# Patient Record
Sex: FEMALE | Race: WHITE | NOT HISPANIC OR LATINO | Employment: OTHER | URBAN - NONMETROPOLITAN AREA
[De-identification: names, ages, dates, MRNs, and addresses within clinical notes are randomized per-mention and may not be internally consistent; named-entity substitution may affect disease eponyms.]

---

## 2017-02-16 DIAGNOSIS — I10 ESSENTIAL HYPERTENSION: ICD-10-CM

## 2017-02-16 RX ORDER — AMLODIPINE BESYLATE 5 MG/1
5 TABLET ORAL
Qty: 90 TAB | Refills: 3 | Status: SHIPPED | OUTPATIENT
Start: 2017-02-16 | End: 2018-02-14 | Stop reason: SDUPTHER

## 2017-04-13 ENCOUNTER — OFFICE VISIT (OUTPATIENT)
Dept: CARDIOLOGY | Facility: PHYSICIAN GROUP | Age: 82
End: 2017-04-13
Payer: MEDICARE

## 2017-04-13 VITALS
HEART RATE: 54 BPM | SYSTOLIC BLOOD PRESSURE: 180 MMHG | HEIGHT: 63 IN | WEIGHT: 102 LBS | BODY MASS INDEX: 18.07 KG/M2 | DIASTOLIC BLOOD PRESSURE: 100 MMHG | OXYGEN SATURATION: 94 %

## 2017-04-13 DIAGNOSIS — I65.23 BILATERAL CAROTID ARTERY STENOSIS: ICD-10-CM

## 2017-04-13 DIAGNOSIS — I11.9 BENIGN HYPERTENSIVE HEART DISEASE WITHOUT HEART FAILURE: ICD-10-CM

## 2017-04-13 DIAGNOSIS — I48.0 PAROXYSMAL ATRIAL FIBRILLATION (HCC): ICD-10-CM

## 2017-04-13 PROCEDURE — G8432 DEP SCR NOT DOC, RNG: HCPCS | Performed by: INTERNAL MEDICINE

## 2017-04-13 PROCEDURE — G8598 ASA/ANTIPLAT THER USED: HCPCS | Performed by: INTERNAL MEDICINE

## 2017-04-13 PROCEDURE — 4040F PNEUMOC VAC/ADMIN/RCVD: CPT | Mod: 8P | Performed by: INTERNAL MEDICINE

## 2017-04-13 PROCEDURE — 1036F TOBACCO NON-USER: CPT | Performed by: INTERNAL MEDICINE

## 2017-04-13 PROCEDURE — 99214 OFFICE O/P EST MOD 30 MIN: CPT | Performed by: INTERNAL MEDICINE

## 2017-04-13 PROCEDURE — G8419 CALC BMI OUT NRM PARAM NOF/U: HCPCS | Performed by: INTERNAL MEDICINE

## 2017-04-13 PROCEDURE — 1101F PT FALLS ASSESS-DOCD LE1/YR: CPT | Mod: 8P | Performed by: INTERNAL MEDICINE

## 2017-04-13 ASSESSMENT — ENCOUNTER SYMPTOMS
SHORTNESS OF BREATH: 0
DEPRESSION: 0
PALPITATIONS: 0
COUGH: 0
WEIGHT LOSS: 0
HEADACHES: 0
LOSS OF CONSCIOUSNESS: 0
DIZZINESS: 0
INSOMNIA: 1
BRUISES/BLEEDS EASILY: 0
NERVOUS/ANXIOUS: 1
MYALGIAS: 0
NAUSEA: 0
HEARTBURN: 0

## 2017-04-13 NOTE — PROGRESS NOTES
Subjective:   Kaylni Chawla is a 83 y.o. female who presents today in f/u in regards to her mild carotid disease and HTN and AF    -very anxious  -meds as directed  -cp now with walking, no more palps    Past Medical History   Diagnosis Date   • Occlusion and stenosis of carotid artery without mention of cerebral infarction October 2012     Carotid ultrasound with <50% stenosis bilaterally.   • Dyslipidemia     • Essential hypertension, benign     • Hypothyroidism     • Irritable bowel syndrome (IBS)     • Valvular heart disease       H/O MVP, not seen on echo of 2009   • Paroxysmal atrial fibrillation (CMS-HCC) April 2016     Isolated event, converted spontaneously to sinus rhythm.   • Depression    • Benign hypertensive heart disease without heart failure May 2016     Echocardiogram with normal LV size, LVEF 55%. Mildly dilated RV. Normal RA and LA. Trace MR. Mild AS (peak 24mmHg, mean 13mmHg). Moderate TR.     Past Surgical History   Procedure Laterality Date   • Hysterectomy, total abdominal     • Tonsillectomy and adenoidectomy     • Hemorrhoidectomy     • Primary c section     • Other surgical procedure       Bladder repair X 3   • Breast biopsy       Open   • Cholecystectomy       Family History   Problem Relation Age of Onset   • Diabetes Father    • Heart Attack Father    • GI Father 67     AMI   • Heart Disease Sister    • Heart Attack Brother 45     Fatal     History   Smoking status   • Never Smoker    Smokeless tobacco   • Never Used     No Known Allergies  Outpatient Encounter Prescriptions as of 4/13/2017   Medication Sig Dispense Refill   • amlodipine (NORVASC) 5 MG Tab Take 1 Tab by mouth every day. 90 Tab 3   • enalapril (VASOTEC) 20 MG tablet Take 20 mg by mouth 2 times a day.     • aspirin EC (ECOTRIN) 81 MG TBEC Take 81 mg by mouth every day.     • CALCIUM PO Take  by mouth. As directed      • docusate sodium (COLACE) 100 MG CAPS Take 100 mg by mouth 2 times a day. As directed     •  "atorvastatin (LIPITOR) 40 MG TABS Take 40 mg by mouth every evening.     • Multiple Vitamin (MULTIVITAMINS PO) Take  by mouth every day.     • felodipine (PLENDIL) 10 MG TB24 Take 10 mg by mouth every day.     • levothyroxine (SYNTHROID) 25 MCG TABS Take 25 mcg by mouth every day.     • ascorbic acid (ASCORBIC ACID) 500 MG TABS Take 500 mg by mouth every day.       No facility-administered encounter medications on file as of 4/13/2017.     Review of Systems   Constitutional: Negative for weight loss and malaise/fatigue.   Respiratory: Negative for cough and shortness of breath.    Cardiovascular: Negative for chest pain, palpitations and leg swelling.   Gastrointestinal: Negative for heartburn and nausea.   Genitourinary: Negative for frequency.   Musculoskeletal: Negative for myalgias.   Neurological: Negative for dizziness, loss of consciousness and headaches.   Endo/Heme/Allergies: Does not bruise/bleed easily.   Psychiatric/Behavioral: Negative for depression and suicidal ideas. The patient is nervous/anxious and has insomnia.    All other systems reviewed and are negative.       Objective:   /100 mmHg  Pulse 54  Ht 1.6 m (5' 2.99\")  Wt 46.267 kg (102 lb)  BMI 18.07 kg/m2  SpO2 94%    Physical Exam   Constitutional: She is oriented to person, place, and time. She appears well-nourished.   Timid, healthy-appearing   HENT:   Head: Atraumatic.   Neck: No JVD present.   Cardiovascular: Normal rate, regular rhythm and intact distal pulses.    No murmur heard.  Pulmonary/Chest: Breath sounds normal.   Musculoskeletal: She exhibits no edema.   Neurological: She is alert and oriented to person, place, and time.   Skin: Skin is warm and dry.   Psychiatric: She has a normal mood and affect. Her behavior is normal.       Assessment:     1. Bilateral carotid artery stenosis     2. Benign hypertensive heart disease without heart failure     3. Paroxysmal atrial fibrillation (CMS-[facility])         Medical Decision " Making:  Today's Assessment / Status / Plan:     Af, persistant.   She is aware of her risk of stroke, but is more concerned about the side effects of anticoagulation. Discussed scan at length    Blood pressure, extremely high. We talked about stress management. Her home blood pressures are quite good. She is not interested in medications. I strongly urged her to follow-up with her anxiety in regards to her primary care doctor. She voices understanding    She is having chest pain which I will write concerned about. I ordered a stress test and an echo. She will think about it. She will continue to follow with her primary. She is aware of using the emergency room if things get more significant. RTC based on above

## 2017-08-09 ENCOUNTER — PATIENT OUTREACH (OUTPATIENT)
Dept: HEALTH INFORMATION MANAGEMENT | Facility: OTHER | Age: 82
End: 2017-08-09

## 2017-08-09 NOTE — PROGRESS NOTES
Outcome: NO ANSWER    WebIZ Checked & Epic Updated:  no    [facility] Verified: no    Attempt # 1

## 2017-08-22 NOTE — PROGRESS NOTES
Attempt #:1    WebIZ Checked & Epic Updated: yes  [facility] Verified: no  Verify PCP: yes    Communication Preference Obtained: yes     Review Care Team: yes    Annual Wellness Visit Scheduling  1. Scheduling Status:Scheduled     Care Gap Scheduling (Attempt to Schedule EACH Overdue Care Gap!)     Health Maintenance Due   Topic Date Due   • Annual Wellness Visit  PT INFORMED   • IMM DTaP/Tdap/Td Vaccine (1 - Tdap) PT INFORMED   • PAP SMEAR  PT INFORMED   • MAMMOGRAM  PT INFORMED   • COLONOSCOPY  PT INFORMED   • IMM ZOSTER VACCINE  PT INFORMED   • BONE DENSITY  PT INFORMED   • IMM PNEUMOCOCCAL 65+ (ADULT) LOW/MEDIUM RISK SERIES (1 of 2 - PCV13) PT INFORMED         MyChart Activation: declined  MyChart Hilary: no  Virtual Visits: no  Opt In to Text Messages: no

## 2017-10-20 ENCOUNTER — TELEPHONE (OUTPATIENT)
Dept: CARDIOLOGY | Facility: MEDICAL CENTER | Age: 82
End: 2017-10-20

## 2017-10-20 NOTE — TELEPHONE ENCOUNTER
Spoke to medical records at [facility] and they confirmed that the Echo and NM Stress tests that LA ordered on 4/13/17 were not done.  Per last office note, patient was going to think about getting tests done that Dr. OLIVIER Epperson wanted her to have done.

## 2017-10-23 PROBLEM — F41.9 ANXIETY: Status: ACTIVE | Noted: 2017-10-23

## 2017-10-23 PROBLEM — I44.7 LBBB (LEFT BUNDLE BRANCH BLOCK): Status: ACTIVE | Noted: 2017-10-23

## 2018-02-14 DIAGNOSIS — I10 ESSENTIAL HYPERTENSION: ICD-10-CM

## 2018-02-15 RX ORDER — AMLODIPINE BESYLATE 5 MG/1
TABLET ORAL
Qty: 90 TAB | Refills: 1 | Status: SHIPPED | OUTPATIENT
Start: 2018-02-15 | End: 2018-08-16 | Stop reason: SDUPTHER

## 2018-08-16 DIAGNOSIS — I10 ESSENTIAL HYPERTENSION: ICD-10-CM

## 2018-08-16 RX ORDER — AMLODIPINE BESYLATE 5 MG/1
TABLET ORAL
Qty: 60 TAB | Refills: 0 | Status: SHIPPED | OUTPATIENT
Start: 2018-08-16

## 2019-05-16 ENCOUNTER — HOSPITAL ENCOUNTER (INPATIENT)
Dept: HOSPITAL 8 - ED | Age: 84
LOS: 2 days | Discharge: HOME | DRG: 246 | End: 2019-05-18
Attending: HOSPITALIST | Admitting: HOSPITALIST
Payer: MEDICARE

## 2019-05-16 VITALS — WEIGHT: 102.51 LBS | BODY MASS INDEX: 18.16 KG/M2 | HEIGHT: 63 IN

## 2019-05-16 DIAGNOSIS — E03.9: ICD-10-CM

## 2019-05-16 DIAGNOSIS — Z90.49: ICD-10-CM

## 2019-05-16 DIAGNOSIS — I70.0: ICD-10-CM

## 2019-05-16 DIAGNOSIS — Z83.3: ICD-10-CM

## 2019-05-16 DIAGNOSIS — I48.91: ICD-10-CM

## 2019-05-16 DIAGNOSIS — Z90.710: ICD-10-CM

## 2019-05-16 DIAGNOSIS — Z88.8: ICD-10-CM

## 2019-05-16 DIAGNOSIS — Z88.1: ICD-10-CM

## 2019-05-16 DIAGNOSIS — J18.9: ICD-10-CM

## 2019-05-16 DIAGNOSIS — I07.1: ICD-10-CM

## 2019-05-16 DIAGNOSIS — Z87.891: ICD-10-CM

## 2019-05-16 DIAGNOSIS — I21.4: Primary | ICD-10-CM

## 2019-05-16 DIAGNOSIS — I34.1: ICD-10-CM

## 2019-05-16 DIAGNOSIS — Z82.49: ICD-10-CM

## 2019-05-16 DIAGNOSIS — Z79.82: ICD-10-CM

## 2019-05-16 DIAGNOSIS — I25.10: ICD-10-CM

## 2019-05-16 DIAGNOSIS — E78.5: ICD-10-CM

## 2019-05-16 DIAGNOSIS — Z90.89: ICD-10-CM

## 2019-05-16 DIAGNOSIS — I44.7: ICD-10-CM

## 2019-05-16 DIAGNOSIS — I10: ICD-10-CM

## 2019-05-16 LAB
ALBUMIN SERPL-MCNC: 3 G/DL (ref 3.4–5)
ALP SERPL-CCNC: 57 U/L (ref 45–117)
ALT SERPL-CCNC: 21 U/L (ref 12–78)
ANION GAP SERPL CALC-SCNC: 6 MMOL/L (ref 5–15)
APTT BLD: 126 SECONDS (ref 25–31)
BASOPHILS # BLD AUTO: 0 X10^3/UL (ref 0–0.1)
BASOPHILS NFR BLD AUTO: 0 % (ref 0–1)
BILIRUB SERPL-MCNC: 0.3 MG/DL (ref 0.2–1)
CALCIUM SERPL-MCNC: 7.9 MG/DL (ref 8.5–10.1)
CHLORIDE SERPL-SCNC: 113 MMOL/L (ref 98–107)
CREAT SERPL-MCNC: 0.79 MG/DL (ref 0.55–1.02)
EOSINOPHIL # BLD AUTO: 0.02 X10^3/UL (ref 0–0.4)
EOSINOPHIL NFR BLD AUTO: 0 % (ref 1–7)
ERYTHROCYTE [DISTWIDTH] IN BLOOD BY AUTOMATED COUNT: 13.6 % (ref 9.6–15.2)
INR PPP: 1.07 (ref 0.93–1.1)
LYMPHOCYTES # BLD AUTO: 0.74 X10^3/UL (ref 1–3.4)
LYMPHOCYTES NFR BLD AUTO: 9 % (ref 22–44)
MCH RBC QN AUTO: 31.5 PG (ref 27–34.8)
MCHC RBC AUTO-ENTMCNC: 34.4 G/DL (ref 32.4–35.8)
MCV RBC AUTO: 91.5 FL (ref 80–100)
MD: NO
MONOCYTES # BLD AUTO: 0.23 X10^3/UL (ref 0.2–0.8)
MONOCYTES NFR BLD AUTO: 3 % (ref 2–9)
NEUTROPHILS # BLD AUTO: 7.21 X10^3/UL (ref 1.8–6.8)
NEUTROPHILS NFR BLD AUTO: 88 % (ref 42–75)
PLATELET # BLD AUTO: 260 X10^3/UL (ref 130–400)
PMV BLD AUTO: 6.9 FL (ref 7.4–10.4)
PROT SERPL-MCNC: 5.6 G/DL (ref 6.4–8.2)
PROTHROMBIN TIME: 11.2 SECONDS (ref 9.6–11.5)
RBC # BLD AUTO: 3.94 X10^6/UL (ref 3.82–5.3)
TROPONIN I SERPL-MCNC: 2.26 NG/ML (ref 0–0.04)

## 2019-05-16 PROCEDURE — 93005 ELECTROCARDIOGRAM TRACING: CPT

## 2019-05-16 PROCEDURE — C1760 CLOSURE DEV, VASC: HCPCS

## 2019-05-16 PROCEDURE — C1894 INTRO/SHEATH, NON-LASER: HCPCS

## 2019-05-16 PROCEDURE — 85520 HEPARIN ASSAY: CPT

## 2019-05-16 PROCEDURE — 83036 HEMOGLOBIN GLYCOSYLATED A1C: CPT

## 2019-05-16 PROCEDURE — C1887 CATHETER, GUIDING: HCPCS

## 2019-05-16 PROCEDURE — 84439 ASSAY OF FREE THYROXINE: CPT

## 2019-05-16 PROCEDURE — 93458 L HRT ARTERY/VENTRICLE ANGIO: CPT

## 2019-05-16 PROCEDURE — C9600 PERC DRUG-EL COR STENT SING: HCPCS

## 2019-05-16 PROCEDURE — 93308 TTE F-UP OR LMTD: CPT

## 2019-05-16 PROCEDURE — 83880 ASSAY OF NATRIURETIC PEPTIDE: CPT

## 2019-05-16 PROCEDURE — 99156 MOD SED OTH PHYS/QHP 5/>YRS: CPT

## 2019-05-16 PROCEDURE — 85025 COMPLETE CBC W/AUTO DIFF WBC: CPT

## 2019-05-16 PROCEDURE — 80053 COMPREHEN METABOLIC PANEL: CPT

## 2019-05-16 PROCEDURE — C1874 STENT, COATED/COV W/DEL SYS: HCPCS

## 2019-05-16 PROCEDURE — 80048 BASIC METABOLIC PNL TOTAL CA: CPT

## 2019-05-16 PROCEDURE — 84484 ASSAY OF TROPONIN QUANT: CPT

## 2019-05-16 PROCEDURE — 81001 URINALYSIS AUTO W/SCOPE: CPT

## 2019-05-16 PROCEDURE — 99285 EMERGENCY DEPT VISIT HI MDM: CPT

## 2019-05-16 PROCEDURE — 87086 URINE CULTURE/COLONY COUNT: CPT

## 2019-05-16 PROCEDURE — 93306 TTE W/DOPPLER COMPLETE: CPT

## 2019-05-16 PROCEDURE — 84443 ASSAY THYROID STIM HORMONE: CPT

## 2019-05-16 PROCEDURE — 85730 THROMBOPLASTIN TIME PARTIAL: CPT

## 2019-05-16 PROCEDURE — 71045 X-RAY EXAM CHEST 1 VIEW: CPT

## 2019-05-16 PROCEDURE — 36415 COLL VENOUS BLD VENIPUNCTURE: CPT

## 2019-05-16 PROCEDURE — 99157 MOD SED OTHER PHYS/QHP EA: CPT

## 2019-05-16 PROCEDURE — 83735 ASSAY OF MAGNESIUM: CPT

## 2019-05-16 PROCEDURE — 80061 LIPID PANEL: CPT

## 2019-05-16 PROCEDURE — C1769 GUIDE WIRE: HCPCS

## 2019-05-16 PROCEDURE — 85610 PROTHROMBIN TIME: CPT

## 2019-05-16 RX ADMIN — ENALAPRIL MALEATE SCH MG: 10 TABLET ORAL at 23:30

## 2019-05-17 VITALS — SYSTOLIC BLOOD PRESSURE: 125 MMHG | DIASTOLIC BLOOD PRESSURE: 69 MMHG

## 2019-05-17 VITALS — DIASTOLIC BLOOD PRESSURE: 61 MMHG | SYSTOLIC BLOOD PRESSURE: 111 MMHG

## 2019-05-17 VITALS — DIASTOLIC BLOOD PRESSURE: 70 MMHG | SYSTOLIC BLOOD PRESSURE: 133 MMHG

## 2019-05-17 VITALS — SYSTOLIC BLOOD PRESSURE: 131 MMHG | DIASTOLIC BLOOD PRESSURE: 65 MMHG

## 2019-05-17 VITALS — SYSTOLIC BLOOD PRESSURE: 127 MMHG | DIASTOLIC BLOOD PRESSURE: 71 MMHG

## 2019-05-17 LAB
ALBUMIN SERPL-MCNC: 2.8 G/DL (ref 3.4–5)
ALP SERPL-CCNC: 53 U/L (ref 45–117)
ALT SERPL-CCNC: 32 U/L (ref 12–78)
ANION GAP SERPL CALC-SCNC: 8 MMOL/L (ref 5–15)
BASOPHILS # BLD AUTO: 0.03 X10^3/UL (ref 0–0.1)
BASOPHILS NFR BLD AUTO: 1 % (ref 0–1)
BILIRUB SERPL-MCNC: 0.4 MG/DL (ref 0.2–1)
CALCIUM SERPL-MCNC: 8 MG/DL (ref 8.5–10.1)
CHLORIDE SERPL-SCNC: 112 MMOL/L (ref 98–107)
CHOL/HDL RATIO: 2
CREAT SERPL-MCNC: 0.63 MG/DL (ref 0.55–1.02)
CULTURE INDICATED?: YES
EOSINOPHIL # BLD AUTO: 0.03 X10^3/UL (ref 0–0.4)
EOSINOPHIL NFR BLD AUTO: 0 % (ref 1–7)
ERYTHROCYTE [DISTWIDTH] IN BLOOD BY AUTOMATED COUNT: 13.8 % (ref 9.6–15.2)
EST. AVERAGE GLUCOSE BLD GHB EST-MCNC: 114 MG/DL (ref 0–126)
HBA1C MFR BLD: 5.6 % (ref 4.2–6.3)
HDL CHOL %: 50 % (ref 28–40)
HDL CHOLESTEROL (DIRECT): 65 MG/DL (ref 40–60)
LDL CHOLESTEROL,CALCULATED: 46 MG/DL (ref 54–169)
LDLC/HDLC SERPL: 0.7 {RATIO} (ref 0.5–3)
LYMPHOCYTES # BLD AUTO: 1.03 X10^3/UL (ref 1–3.4)
LYMPHOCYTES NFR BLD AUTO: 15 % (ref 22–44)
MCH RBC QN AUTO: 31.4 PG (ref 27–34.8)
MCHC RBC AUTO-ENTMCNC: 34.1 G/DL (ref 32.4–35.8)
MCV RBC AUTO: 92.1 FL (ref 80–100)
MD: NO
MICROSCOPIC: (no result)
MONOCYTES # BLD AUTO: 0.37 X10^3/UL (ref 0.2–0.8)
MONOCYTES NFR BLD AUTO: 5 % (ref 2–9)
NEUTROPHILS # BLD AUTO: 5.63 X10^3/UL (ref 1.8–6.8)
NEUTROPHILS NFR BLD AUTO: 79 % (ref 42–75)
PLATELET # BLD AUTO: 242 X10^3/UL (ref 130–400)
PMV BLD AUTO: 6.8 FL (ref 7.4–10.4)
PROT SERPL-MCNC: 5.3 G/DL (ref 6.4–8.2)
RBC # BLD AUTO: 3.64 X10^6/UL (ref 3.82–5.3)
T4 FREE SERPL-MCNC: 1.03 NG/DL (ref 0.76–1.46)
TRIGL SERPL-MCNC: 101 MG/DL (ref 50–200)
TROPONIN I SERPL-MCNC: 2.01 NG/ML (ref 0–0.04)
TROPONIN I SERPL-MCNC: 2.28 NG/ML (ref 0–0.04)
TROPONIN I SERPL-MCNC: 3.12 NG/ML (ref 0–0.04)
TSH SERPL-ACNC: 3.45 MIU/L (ref 0.36–3.74)
VLDLC SERPL CALC-MCNC: 20 MG/DL (ref 0–25)

## 2019-05-17 PROCEDURE — 4A023N7 MEASUREMENT OF CARDIAC SAMPLING AND PRESSURE, LEFT HEART, PERCUTANEOUS APPROACH: ICD-10-PCS | Performed by: INTERNAL MEDICINE

## 2019-05-17 PROCEDURE — 027034Z DILATION OF CORONARY ARTERY, ONE ARTERY WITH DRUG-ELUTING INTRALUMINAL DEVICE, PERCUTANEOUS APPROACH: ICD-10-PCS | Performed by: INTERNAL MEDICINE

## 2019-05-17 PROCEDURE — B2151ZZ FLUOROSCOPY OF LEFT HEART USING LOW OSMOLAR CONTRAST: ICD-10-PCS | Performed by: INTERNAL MEDICINE

## 2019-05-17 PROCEDURE — B2111ZZ FLUOROSCOPY OF MULTIPLE CORONARY ARTERIES USING LOW OSMOLAR CONTRAST: ICD-10-PCS | Performed by: INTERNAL MEDICINE

## 2019-05-17 RX ADMIN — METOPROLOL TARTRATE SCH MG: 25 TABLET, FILM COATED ORAL at 00:00

## 2019-05-17 RX ADMIN — OXYCODONE HYDROCHLORIDE AND ACETAMINOPHEN SCH MG: 500 TABLET ORAL at 14:05

## 2019-05-17 RX ADMIN — ENALAPRIL MALEATE SCH MG: 10 TABLET ORAL at 20:17

## 2019-05-17 RX ADMIN — Medication SCH TAB: at 14:05

## 2019-05-17 RX ADMIN — AMLODIPINE BESYLATE SCH MG: 10 TABLET ORAL at 00:39

## 2019-05-17 RX ADMIN — DOCUSATE SODIUM SCH MG: 100 CAPSULE, LIQUID FILLED ORAL at 20:18

## 2019-05-17 RX ADMIN — METOPROLOL TARTRATE SCH MG: 25 TABLET, FILM COATED ORAL at 17:39

## 2019-05-17 RX ADMIN — METOPROLOL TARTRATE SCH MG: 25 TABLET, FILM COATED ORAL at 06:00

## 2019-05-17 RX ADMIN — DOCUSATE SODIUM SCH MG: 100 CAPSULE, LIQUID FILLED ORAL at 00:39

## 2019-05-17 RX ADMIN — ATORVASTATIN CALCIUM SCH MG: 40 TABLET, FILM COATED ORAL at 20:17

## 2019-05-17 RX ADMIN — ENALAPRIL MALEATE SCH MG: 10 TABLET ORAL at 13:00

## 2019-05-17 RX ADMIN — ASPIRIN SCH MG: 325 TABLET, FILM COATED ORAL at 06:27

## 2019-05-17 RX ADMIN — LEVOTHYROXINE SODIUM SCH MCG: 25 TABLET ORAL at 14:05

## 2019-05-17 RX ADMIN — AMLODIPINE BESYLATE SCH MG: 10 TABLET ORAL at 20:18

## 2019-05-17 RX ADMIN — ATORVASTATIN CALCIUM SCH MG: 40 TABLET, FILM COATED ORAL at 00:39

## 2019-05-18 VITALS — DIASTOLIC BLOOD PRESSURE: 57 MMHG | SYSTOLIC BLOOD PRESSURE: 108 MMHG

## 2019-05-18 VITALS — DIASTOLIC BLOOD PRESSURE: 67 MMHG | SYSTOLIC BLOOD PRESSURE: 127 MMHG

## 2019-05-18 VITALS — SYSTOLIC BLOOD PRESSURE: 132 MMHG | DIASTOLIC BLOOD PRESSURE: 53 MMHG

## 2019-05-18 VITALS — SYSTOLIC BLOOD PRESSURE: 122 MMHG | DIASTOLIC BLOOD PRESSURE: 70 MMHG

## 2019-05-18 LAB
ANION GAP SERPL CALC-SCNC: 6 MMOL/L (ref 5–15)
BASOPHILS # BLD AUTO: 0.02 X10^3/UL (ref 0–0.1)
BASOPHILS NFR BLD AUTO: 0 % (ref 0–1)
CALCIUM SERPL-MCNC: 8.4 MG/DL (ref 8.5–10.1)
CHLORIDE SERPL-SCNC: 111 MMOL/L (ref 98–107)
CREAT SERPL-MCNC: 0.71 MG/DL (ref 0.55–1.02)
EOSINOPHIL # BLD AUTO: 0.03 X10^3/UL (ref 0–0.4)
EOSINOPHIL NFR BLD AUTO: 0 % (ref 1–7)
ERYTHROCYTE [DISTWIDTH] IN BLOOD BY AUTOMATED COUNT: 13.6 % (ref 9.6–15.2)
LYMPHOCYTES # BLD AUTO: 0.96 X10^3/UL (ref 1–3.4)
LYMPHOCYTES NFR BLD AUTO: 14 % (ref 22–44)
MCH RBC QN AUTO: 31.8 PG (ref 27–34.8)
MCHC RBC AUTO-ENTMCNC: 33.7 G/DL (ref 32.4–35.8)
MCV RBC AUTO: 94.5 FL (ref 80–100)
MD: NO
MONOCYTES # BLD AUTO: 0.43 X10^3/UL (ref 0.2–0.8)
MONOCYTES NFR BLD AUTO: 6 % (ref 2–9)
NEUTROPHILS # BLD AUTO: 5.47 X10^3/UL (ref 1.8–6.8)
NEUTROPHILS NFR BLD AUTO: 79 % (ref 42–75)
PLATELET # BLD AUTO: 233 X10^3/UL (ref 130–400)
PMV BLD AUTO: 6.9 FL (ref 7.4–10.4)
RBC # BLD AUTO: 3.73 X10^6/UL (ref 3.82–5.3)

## 2019-05-18 RX ADMIN — METOPROLOL TARTRATE SCH MG: 25 TABLET, FILM COATED ORAL at 04:57

## 2019-05-18 RX ADMIN — ASPIRIN SCH MG: 325 TABLET, FILM COATED ORAL at 04:57

## 2019-05-18 RX ADMIN — OXYCODONE HYDROCHLORIDE AND ACETAMINOPHEN SCH MG: 500 TABLET ORAL at 09:21

## 2019-05-18 RX ADMIN — LEVOTHYROXINE SODIUM SCH MCG: 25 TABLET ORAL at 09:21

## 2019-05-18 RX ADMIN — ENALAPRIL MALEATE SCH MG: 10 TABLET ORAL at 09:22

## 2019-05-18 RX ADMIN — Medication SCH TAB: at 09:21
